# Patient Record
Sex: MALE | Race: WHITE
[De-identification: names, ages, dates, MRNs, and addresses within clinical notes are randomized per-mention and may not be internally consistent; named-entity substitution may affect disease eponyms.]

---

## 2019-10-30 ENCOUNTER — HOSPITAL ENCOUNTER (OUTPATIENT)
Dept: HOSPITAL 92 - BICRAD | Age: 64
Discharge: HOME | End: 2019-10-30
Attending: FAMILY MEDICINE
Payer: COMMERCIAL

## 2019-10-30 DIAGNOSIS — M16.11: ICD-10-CM

## 2019-10-30 DIAGNOSIS — M25.551: Primary | ICD-10-CM

## 2019-10-30 NOTE — RAD
RIGHT HIP TWO VIEWS:

10/30/19

 

HISTORY: 

Right hip pain.

 

FINDINGS/IMPRESSION: 

Degenerative changes are present. No fracture or dislocation or bony destruction identified. 

 

POS: [facility]

## 2021-02-08 ENCOUNTER — HOSPITAL ENCOUNTER (OUTPATIENT)
Dept: HOSPITAL 92 - ERS | Age: 66
Setting detail: OBSERVATION
LOS: 1 days | Discharge: HOME | End: 2021-02-09
Attending: INTERNAL MEDICINE | Admitting: INTERNAL MEDICINE
Payer: COMMERCIAL

## 2021-02-08 DIAGNOSIS — I10: ICD-10-CM

## 2021-02-08 DIAGNOSIS — I34.0: ICD-10-CM

## 2021-02-08 DIAGNOSIS — J32.9: ICD-10-CM

## 2021-02-08 DIAGNOSIS — E78.5: ICD-10-CM

## 2021-02-08 DIAGNOSIS — Z79.899: ICD-10-CM

## 2021-02-08 DIAGNOSIS — G45.9: Primary | ICD-10-CM

## 2021-02-08 DIAGNOSIS — Z20.822: ICD-10-CM

## 2021-02-08 LAB
ALBUMIN SERPL BCG-MCNC: 4.2 G/DL (ref 3.4–4.8)
ALP SERPL-CCNC: 55 U/L (ref 40–110)
ALT SERPL W P-5'-P-CCNC: 16 U/L (ref 8–55)
ANION GAP SERPL CALC-SCNC: 14 MMOL/L (ref 10–20)
AST SERPL-CCNC: 19 U/L (ref 5–34)
BASOPHILS # BLD AUTO: 0.1 THOU/UL (ref 0–0.2)
BASOPHILS NFR BLD AUTO: 0.9 % (ref 0–1)
BILIRUB SERPL-MCNC: 0.9 MG/DL (ref 0.2–1.2)
BUN SERPL-MCNC: 20 MG/DL (ref 8.4–25.7)
CALCIUM SERPL-MCNC: 9 MG/DL (ref 7.8–10.44)
CHLORIDE SERPL-SCNC: 101 MMOL/L (ref 98–107)
CO2 SERPL-SCNC: 27 MMOL/L (ref 23–31)
CREAT CL PREDICTED SERPL C-G-VRATE: 0 ML/MIN (ref 70–130)
EOSINOPHIL # BLD AUTO: 0.2 THOU/UL (ref 0–0.7)
EOSINOPHIL NFR BLD AUTO: 3.4 % (ref 0–10)
GLOBULIN SER CALC-MCNC: 3.1 G/DL (ref 2.4–3.5)
GLUCOSE SERPL-MCNC: 115 MG/DL (ref 80–115)
HGB BLD-MCNC: 17 G/DL (ref 14–18)
LYMPHOCYTES # BLD: 2 THOU/UL (ref 1.2–3.4)
LYMPHOCYTES NFR BLD AUTO: 27.9 % (ref 21–51)
MCH RBC QN AUTO: 32.1 PG (ref 27–31)
MCV RBC AUTO: 94.9 FL (ref 78–98)
MONOCYTES # BLD AUTO: 0.6 THOU/UL (ref 0.11–0.59)
MONOCYTES NFR BLD AUTO: 8.7 % (ref 0–10)
NEUTROPHILS # BLD AUTO: 4.2 THOU/UL (ref 1.4–6.5)
NEUTROPHILS NFR BLD AUTO: 59.2 % (ref 42–75)
PLATELET # BLD AUTO: 279 THOU/UL (ref 130–400)
POTASSIUM SERPL-SCNC: 4.2 MMOL/L (ref 3.5–5.1)
RBC # BLD AUTO: 5.3 MILL/UL (ref 4.7–6.1)
SODIUM SERPL-SCNC: 138 MMOL/L (ref 136–145)
TROPONIN I SERPL DL<=0.01 NG/ML-MCNC: (no result) NG/ML (ref ?–0.03)
TROPONIN I SERPL DL<=0.01 NG/ML-MCNC: (no result) NG/ML (ref ?–0.03)
WBC # BLD AUTO: 7.1 THOU/UL (ref 4.8–10.8)

## 2021-02-08 PROCEDURE — 84484 ASSAY OF TROPONIN QUANT: CPT

## 2021-02-08 PROCEDURE — 80048 BASIC METABOLIC PNL TOTAL CA: CPT

## 2021-02-08 PROCEDURE — 87635 SARS-COV-2 COVID-19 AMP PRB: CPT

## 2021-02-08 PROCEDURE — 93880 EXTRACRANIAL BILAT STUDY: CPT

## 2021-02-08 PROCEDURE — 80053 COMPREHEN METABOLIC PANEL: CPT

## 2021-02-08 PROCEDURE — 70551 MRI BRAIN STEM W/O DYE: CPT

## 2021-02-08 PROCEDURE — 80061 LIPID PANEL: CPT

## 2021-02-08 PROCEDURE — 93306 TTE W/DOPPLER COMPLETE: CPT

## 2021-02-08 PROCEDURE — U0005 INFEC AGEN DETEC AMPLI PROBE: HCPCS

## 2021-02-08 PROCEDURE — 83036 HEMOGLOBIN GLYCOSYLATED A1C: CPT

## 2021-02-08 PROCEDURE — 36415 COLL VENOUS BLD VENIPUNCTURE: CPT

## 2021-02-08 PROCEDURE — G0378 HOSPITAL OBSERVATION PER HR: HCPCS

## 2021-02-08 PROCEDURE — U0003 INFECTIOUS AGENT DETECTION BY NUCLEIC ACID (DNA OR RNA); SEVERE ACUTE RESPIRATORY SYNDROME CORONAVIRUS 2 (SARS-COV-2) (CORONAVIRUS DISEASE [COVID-19]), AMPLIFIED PROBE TECHNIQUE, MAKING USE OF HIGH THROUGHPUT TECHNOLOGIES AS DESCRIBED BY CMS-2020-01-R: HCPCS

## 2021-02-08 PROCEDURE — 70450 CT HEAD/BRAIN W/O DYE: CPT

## 2021-02-08 PROCEDURE — 85025 COMPLETE CBC W/AUTO DIFF WBC: CPT

## 2021-02-08 PROCEDURE — 93005 ELECTROCARDIOGRAM TRACING: CPT

## 2021-02-08 NOTE — CT
CT HEAD WITHOUT IV CONTRAST



COMPARISON:

 None



HISTORY:

 Episode of left arm numbness and tingling as well as left-sided facial weakness. Symptoms have resol
li.



TECHNIQUE: Axial CT imaging at 5 mm intervals from vertex through skull base without contrast



FINDINGS:

There are scattered areas of decreased attenuation in the periventricular white matter which are nons
pecific but likely reflective of chronic small vessel ischemic changes. Low-attenuation focus is

seen in the right globus pallidus suggesting a tiny lacunar infarction of indeterminate age.



There is mild cerebral volume loss. The ventricular system is normal in size, shape, and position for
 the degree of sulcal atrophy.



There is no evidence of an acute cortical infarction, hemorrhage, mass effect, or midline shift.



Skull base has a normal CT appearance.

Gross thickening is seen in several ethmoidal air cells. Visualized mastoid air cells are clear.



Osseous structures appear intact.



IMPRESSION:

1. No acute intracranial abnormality demonstrated.

2. Chronic small vessel ischemic changes and cerebral volume loss. 

3. Sinus disease. 



Reported By: Kolton Khan 

Electronically Signed:  2/8/2021 2:41 PM

## 2021-02-08 NOTE — PDOC.HHP
Hospitalist HPI


Left face and arm numbness 


History of Present Illness: 


Patient is 65 year-old with PMH of HTN and HLD who presents to the ED with left 

face and left arm/hand numbness that happened around noon today and it lasted 

for about 5 minutes before it resolved. He is not sure about facial droop but no

focal weakness. 


ED Course: 





CT head showed no acute intracranial abnormalities. 


Allergies/Adverse Reactions: 


                                        











Allergy/AdvReac Type Severity Reaction Status Date / Time


 


No Known Allergies Allergy   Unverified 02/08/21 18:36











Past History: 


PMHx: HTN, HLD 





PSHx: TURP, Tosillectomy    





FHx:Mother: CHF    





Social:drinks 2 beer per day, never smoker, denies drug 








Hospitalist HPI ROS


Constitutional: denies: fever, chills


Eyes: denies: vision change


ENT: denies: throat pain


Respiratory: denies: shortness of breath


Cardiovascular: denies: chest pain


Gastrointestinal: denies: nausea, vomiting


Genitourinary: denies: dysuria


Skin: denies: rash


Other: 





positive for facial and arm numbness 





Hospitalist Exam


General Appearance: NAD


Eye: PERRL


ENT: moist mucosa


Neck: supple


Heart: RRR, no murmur


Respiratory: CTAB, no wheezes


Gastrointestinal: soft, non-tender, non-distended


Extremities: no edema


Skin: normal turgor


Neurological: normal sensation to touch, no weakness, no focal deficits


Musculoskeletal: normal strength


Psychiatric: normal affect, A&O x 3





Hospitalist Results


Result Diagrams: 


                                 02/08/21 12:52





                                 02/08/21 12:52


Lab results: 


                             Laboratory Last Values











WBC  7.1 thou/uL (4.8-10.8)   02/08/21  12:52    


 


RBC  5.30 mill/uL (4.70-6.10)   02/08/21  12:52    


 


Hgb  17.0 g/dL (14.0-18.0)   02/08/21  12:52    


 


Hct  50.3 % (42.0-52.0)   02/08/21  12:52    


 


MCV  94.9 fL (78.0-98.0)   02/08/21  12:52    


 


MCH  32.1 pg (27.0-31.0)  H  02/08/21  12:52    


 


MCHC  33.9 g/dL (32.0-36.0)   02/08/21  12:52    


 


RDW  11.5 % (11.5-14.5)   02/08/21  12:52    


 


Plt Count  279 thou/uL (130-400)   02/08/21  12:52    


 


MPV  7.7 fL (7.4-10.4)   02/08/21  12:52    


 


Neutrophils %  59.2 % (42.0-75.0)   02/08/21  12:52    


 


Lymphocytes %  27.9 % (21.0-51.0)   02/08/21  12:52    


 


Monocytes %  8.7 % (0.0-10.0)   02/08/21  12:52    


 


Eosinophils %  3.4 % (0.0-10.0)   02/08/21  12:52    


 


Basophils %  0.9 % (0.0-1.0)   02/08/21  12:52    


 


Neutrophils #  4.2 thou/uL (1.40-6.50)   02/08/21  12:52    


 


Lymphocytes #  2.0 thou/uL (1.20-3.40)   02/08/21  12:52    


 


Monocytes #  0.6 thou/uL (0.11-0.59)  H  02/08/21  12:52    


 


Eosinophils #  0.2 thou/uL (0.0-0.7)   02/08/21  12:52    


 


Basophils #  0.1 thou/uL (0.0-0.2)   02/08/21  12:52    


 


Sodium  138 mmol/L (136-145)   02/08/21  12:52    


 


Potassium  4.2 mmol/L (3.5-5.1)   02/08/21  12:52    


 


Chloride  101 mmol/L ()   02/08/21  12:52    


 


Carbon Dioxide  27 mmol/L (23-31)   02/08/21  12:52    


 


Anion Gap  14 mmol/L (10-20)   02/08/21  12:52    


 


BUN  20 mg/dL (8.4-25.7)   02/08/21  12:52    


 


Creatinine  1.05 mg/dL (0.7-1.3)   02/08/21  12:52    


 


Estimated GFR (MDRD)  71   02/08/21  12:52    


 


Glucose  115 mg/dL ()   02/08/21  12:52    


 


Calcium  9.0 mg/dL (7.8-10.44)   02/08/21  12:52    


 


Total Bilirubin  0.9 mg/dL (0.2-1.2)   02/08/21  12:52    


 


AST  19 U/L (5-34)   02/08/21  12:52    


 


ALT  16 U/L (8-55)   02/08/21  12:52    


 


Alkaline Phosphatase  55 U/L ()   02/08/21  12:52    


 


Troponin I  Less than  0.010 ng/mL (< 0.028)   02/08/21  19:09    


 


Serum Total Protein  7.3 g/dL (5.8-8.1)   02/08/21  12:52    


 


Albumin  4.2 g/dL (3.4-4.8)   02/08/21  12:52    


 


Globulin  3.1 g/dL (2.4-3.5)   02/08/21  12:52    


 


Albumin/Globulin Ratio  1.4 g/dL (1.2-2.2)   02/08/21  12:52    











  ** CT scan - head


Status: image reviewed by me





Hospitalist H&P A/P


(1) TIA (transient ischemic attack)


Code(s): G45.9 - TRANSIENT CEREBRAL ISCHEMIC ATTACK, UNSPECIFIED   Status: Acute

  


Assessment and Plan: 


Patient's symptoms now resolved. CT head showed no acute intracranial 

abnormalities. His symptoms are likely from TIA.





Plan:


-frequent neuro checks


-MRI brain, Echo, carotid doppler 


-Neurology consulted  








(2) HTN (hypertension)


Code(s): I10 - ESSENTIAL (PRIMARY) HYPERTENSION   Status: Chronic   


Assessment and Plan: 


-cont home meds 








(3) HLD (hyperlipidemia)


Code(s): E78.5 - HYPERLIPIDEMIA, UNSPECIFIED   Status: Chronic   


Assessment and Plan: 


-cont home meds

## 2021-02-09 VITALS — DIASTOLIC BLOOD PRESSURE: 99 MMHG | SYSTOLIC BLOOD PRESSURE: 145 MMHG

## 2021-02-09 VITALS — TEMPERATURE: 98.7 F

## 2021-02-09 LAB
ANION GAP SERPL CALC-SCNC: 15 MMOL/L (ref 10–20)
BASOPHILS # BLD AUTO: 0 THOU/UL (ref 0–0.2)
BASOPHILS NFR BLD AUTO: 0.5 % (ref 0–1)
BUN SERPL-MCNC: 16 MG/DL (ref 8.4–25.7)
CALCIUM SERPL-MCNC: 8.6 MG/DL (ref 7.8–10.44)
CHD RISK SERPL-RTO: 6.4 (ref ?–4.5)
CHLORIDE SERPL-SCNC: 104 MMOL/L (ref 98–107)
CHOLEST SERPL-MCNC: 238 MG/DL
CO2 SERPL-SCNC: 24 MMOL/L (ref 23–31)
CREAT CL PREDICTED SERPL C-G-VRATE: 0 ML/MIN (ref 70–130)
EOSINOPHIL # BLD AUTO: 0.3 THOU/UL (ref 0–0.7)
EOSINOPHIL NFR BLD AUTO: 4.3 % (ref 0–10)
GLUCOSE SERPL-MCNC: 103 MG/DL (ref 80–115)
HDLC SERPL-MCNC: 37 MG/DL
HGB BLD-MCNC: 17 G/DL (ref 14–18)
LDLC SERPL CALC-MCNC: 171 MG/DL
LYMPHOCYTES # BLD: 1.5 THOU/UL (ref 1.2–3.4)
LYMPHOCYTES NFR BLD AUTO: 20.1 % (ref 21–51)
MCH RBC QN AUTO: 32.8 PG (ref 27–31)
MCV RBC AUTO: 94.7 FL (ref 78–98)
MONOCYTES # BLD AUTO: 0.6 THOU/UL (ref 0.11–0.59)
MONOCYTES NFR BLD AUTO: 7.8 % (ref 0–10)
NEUTROPHILS # BLD AUTO: 5 THOU/UL (ref 1.4–6.5)
NEUTROPHILS NFR BLD AUTO: 67.3 % (ref 42–75)
PLATELET # BLD AUTO: 284 THOU/UL (ref 130–400)
POTASSIUM SERPL-SCNC: 4.1 MMOL/L (ref 3.5–5.1)
RBC # BLD AUTO: 5.19 MILL/UL (ref 4.7–6.1)
SODIUM SERPL-SCNC: 139 MMOL/L (ref 136–145)
TRIGL SERPL-MCNC: 148 MG/DL (ref ?–150)
WBC # BLD AUTO: 7.4 THOU/UL (ref 4.8–10.8)

## 2021-02-09 NOTE — PDOC.DS.DS
Provider


Date of Admission: 


02/08/21 16:11





Date of Discharge: 02/09/21


Admitting Provider: 


Yvette Knowles MD





Consultations: Neurology


Primary Care Physician: 


Dorain Kolb, DO








Course


Hospital Course: 


Diagnosis


TIA


HTN


HLD 





Hospital Course: Patient is a 65-year-old male with PMH of HTN and HLD who p

resents to the ED with left face/left arm paresthesia that lasted for 5 minutes.

 He remained asymptomatic during his hospitalization.  MRI brain showed no acute

intracranial abnormalities. EKG showed NSR. Carotid Doppler showed no 

hemodynamically significant stenosis. Hgb A1c is normal. LDL is elevated.  Echo 

was done, result pending.  Patient would like to be discharged now, so he will 

follow-up his Echo results with his PCP. 





Resuscitation Status: 








02/08/21 18:37


Resuscitation Status Routine 


   Resuscitation Status: FULL: Full Resuscitation








Lab Results: 


                                        





                                 02/09/21 04:28 





                                 02/09/21 04:27 





Abnormal Lab Results - Last 48 hrs





02/08/21 12:52: MCH 32.1 H, Monocytes # 0.6 H


02/09/21 04:27: Cholesterol 238 H


02/09/21 04:28: MCH 32.8 H, Lymphocytes % 20.1 L, Monocytes # 0.6 H








Vitals: 


                             Vital Signs (12 hours)











  Pulse Resp BP BP BP Pulse Ox


 


 02/09/21 13:50    145/99 H  152/92 H  


 


 02/09/21 11:49    160/88 H  152/94 H  


 


 02/09/21 04:00  68  16    148/78 H  98











Physical Exam: 


The patient was seen and examined on the day of discharge.





General Appearance: NAD


Eye: PERRL


ENT: moist mucosa


Neck: supple


Respiratory: CTAB


Cardiovascular: RRR, no murmur


Gastrointestinal: soft, non-tender, non-distended


Extremities: no edema


Neurological: normal sensation to touch, no weakness, no new deficit


Musculoskeletal: normal strength


PSYCH: normal affect





Problem


(1) TIA (transient ischemic attack)


Code(s): G45.9 - TRANSIENT CEREBRAL ISCHEMIC ATTACK, UNSPECIFIED   Status: Acute

  





(2) HTN (hypertension)


Code(s): I10 - ESSENTIAL (PRIMARY) HYPERTENSION   Status: Chronic   





(3) HLD (hyperlipidemia)


Code(s): E78.5 - HYPERLIPIDEMIA, UNSPECIFIED   Status: Chronic   





Plan


Prescriptions: 


Aspirin 81 mg PO DAILY #30 tab.chew


Atorvastatin Calcium [Lipitor] 40 mg PO DAILY #30 tab


Home Medications: 


                                        











 Medication  Instructions  Recorded  Confirmed  Type


 


Aspirin 81 mg PO DAILY #30 tab.chew 02/09/21  Rx


 


Atorvastatin Calcium [Lipitor] 40 mg PO DAILY #30 tab 02/09/21  Rx











Allergies: 








No Known Allergies Allergy (Unverified 02/08/21 18:36)


   





Discharge Instructions:: 


F/u with your PCP for Echo result


Activity:: Activity as Tolerated


Nourishment:: Heart Healthy Diet


Referrals: 


Dorian Kolb DO [Primary Care Provider] - 3 Days


Disposition: HOME





Quality


CORE MEASURES:: Stroke/TIA


Did you prescribe antithrombotic therapy?: Yes


Did you prescribe anticoagulant for A Fib/Flutter?: No


Specify reason for no DC anticoagulant: Treatment not indicated


Did you prescribe a statin medication?: Yes

## 2021-02-09 NOTE — ULT
EXAM: Carotid ultrasound



HISTORY: TIA with left-sided weakness



COMPARISON: None



TECHNIQUE: Multiplanar grayscale and color Doppler images were obtained in a carotid ultrasound. Spec
tral analysis of the Doppler waveforms were performed.



FINDINGS:

No significant plaque is visualized in either internal carotid artery. No significant plaque is seen 
in either common carotid artery.



The Doppler waveforms are normal in the visualized vessels.



Peak systolic velocity in the right internal carotid artery 59 cm/s.

Peak systolic velocity in the right common carotid artery 62 cm/s.

The right ICA/CCA ratio is 1.0.



Peak systolic velocity in the left internal carotid artery 77 cm/s.

Peak systolic velocity in the left common carotid artery 73 cm/s.

The left ICA/CCA ratio is 1.1.



Both vertebral arteries demonstrate antegrade flow without focal stenosis



IMPRESSION:

No evidence of hemodynamically significant stenosis.



Reported By: Dorian Alfaro 

Electronically Signed:  2/9/2021 7:39 AM

## 2021-02-09 NOTE — CON
NEUROLOGY CONSULTATION

DATE OF CONSULTATION:  02/08/2021



REASON FOR CONSULTATION:  Transient ischemic attack.



HISTORY OF PRESENT ILLNESS:  Mr. Whitlock is a 65-year-old male with medical 
history

significant for hypertension and hyperlipidemia, presented to the emergency room

on 2/8/2021with left facial and left arm paresthesias, which happened

around 12 p.m. on 02/07/2021 and lasted for about 5 minutes.  The patient denies
any

focal weakness, nausea, vomiting, headache, chest pain, double vision, blurred

vision, involuntary movements, altered mental status, confusion associated with

episode.  The wife is at bedside, but she did not witness the event.  In the

emergency room, head CT was done, which was negative for acute intracranial

pathology. 



ALLERGIES:  NO KNOWN DRUG ALLERGIES.



PAST MEDICAL HISTORY:  Hypertension, hyperlipidemia.



PAST SURGICAL HISTORY:  TURP, tonsillectomy.



FAMILY HISTORY:  Significant for congestive heart failure in the mother.



SOCIAL HISTORY:  The patient drinks 2 beers per day.  Denies smoking or illegal

drug use.  .  Lives with his wife. 



REVIEW OF SYSTEMS:  All systems reviewed and were negative except the pertinent

positives and negatives mentioned in the HPI. 



PHYSICAL EXAMINATION:



148/78 98 16

GENERAL APPEARANCE:  NAD. 

CVS:  Regular rate and rhythm. 

CHEST:  Clear. 

ABDOMEN:  Soft. 

NECK:  Supple. 

NEUROLOGICAL:  Mental status, the patient is alert and oriented to person, 
place,

and time.  Recent and remote memory intact.  Fund of knowledge is appropriate.

Speech is clear.  Cranial nerves 2 through 12 intact.  Muscle tone and bulk are

normal.  Strength 5/5 bilaterally.  Sensory intact.  Cerebellar, finger-nose 
testing

intact.  Gait deferred due to the patient's safety reason. 



DATA REVIEWED:  I reviewed the labs, which were essentially unremarkable.  Head 
CT

did not reveal any acute intracranial pathology. 



Lab results: 

                             Laboratory Last Values







WBC  7.1 thou/uL (4.8-10.8)   02/08/21  12:52    

 

RBC  5.30 mill/uL (4.70-6.10)   02/08/21  12:52    

 

Hgb  17.0 g/dL (14.0-18.0)   02/08/21  12:52    

 

Hct  50.3 % (42.0-52.0)   02/08/21  12:52    

 

MCV  94.9 fL (78.0-98.0)   02/08/21  12:52    

 

MCH  32.1 pg (27.0-31.0)  H  02/08/21  12:52    

 

MCHC  33.9 g/dL (32.0-36.0)   02/08/21  12:52    

 

RDW  11.5 % (11.5-14.5)   02/08/21  12:52    

 

Plt Count  279 thou/uL (130-400)   02/08/21  12:52    

 

MPV  7.7 fL (7.4-10.4)   02/08/21  12:52    

 

Neutrophils %  59.2 % (42.0-75.0)   02/08/21  12:52    

 

Lymphocytes %  27.9 % (21.0-51.0)   02/08/21  12:52    

 

Monocytes %  8.7 % (0.0-10.0)   02/08/21  12:52    

 

Eosinophils %  3.4 % (0.0-10.0)   02/08/21  12:52    

 

Basophils %  0.9 % (0.0-1.0)   02/08/21  12:52    

 

Neutrophils #  4.2 thou/uL (1.40-6.50)   02/08/21  12:52    

 

Lymphocytes #  2.0 thou/uL (1.20-3.40)   02/08/21  12:52    

 

Monocytes #  0.6 thou/uL (0.11-0.59)  H  02/08/21  12:52    

 

Eosinophils #  0.2 thou/uL (0.0-0.7)   02/08/21  12:52    

 

Basophils #  0.1 thou/uL (0.0-0.2)   02/08/21  12:52    

 

Sodium  138 mmol/L (136-145)   02/08/21  12:52    

 

Potassium  4.2 mmol/L (3.5-5.1)   02/08/21  12:52    

 

Chloride  101 mmol/L ()   02/08/21  12:52    

 

Carbon Dioxide  27 mmol/L (23-31)   02/08/21  12:52    

 

Anion Gap  14 mmol/L (10-20)   02/08/21  12:52    

 

BUN  20 mg/dL (8.4-25.7)   02/08/21  12:52    

 

Creatinine  1.05 mg/dL (0.7-1.3)   02/08/21  12:52    

 

Estimated GFR (MDRD)  71   02/08/21  12:52    

 

Glucose  115 mg/dL ()   02/08/21  12:52    

 

Calcium  9.0 mg/dL (7.8-10.44)   02/08/21  12:52    

 

Total Bilirubin  0.9 mg/dL (0.2-1.2)   02/08/21  12:52    

 

AST  19 U/L (5-34)   02/08/21  12:52    

 

ALT  16 U/L (8-55)   02/08/21  12:52    

 

Alkaline Phosphatase  55 U/L ()   02/08/21  12:52    

 

Troponin I  Less than  0.010 ng/mL (< 0.028)   02/08/21  19:09    

 

Serum Total Protein  7.3 g/dL (5.8-8.1)   02/08/21  12:52    

 

Albumin  4.2 g/dL (3.4-4.8)   02/08/21  12:52    

 

Globulin  3.1 g/dL (2.4-3.5)   02/08/21  12:52    

 

Albumin/Globulin Ratio  1.4 g/dL (1.2-2.2)   02/08/21  12:52    







  ** CT scan - head

Status: image reviewed by me



 



 







ASSESSMENT AND PLAN:  



(1) TIA (transient ischemic attack)

Code(s): G45.9 - TRANSIENT CEREBRAL ISCHEMIC ATTACK, UNSPECIFIED   Status: Acute
  

 

(2) HTN (hypertension)

Code(s): I10 - ESSENTIAL (PRIMARY) HYPERTENSION   Status: Chronic   

 

(3) HLD (hyperlipidemia)

Code(s): E78.5 - HYPERLIPIDEMIA, UNSPECIFIED   Status: Chronic   





Mr. Whitlock is a 65-year-old male with history significant for

hypertension and hyperlipidemia, presented with 5-minute episode of facial and 
right

arm paresthesias, which resolved and the patient is back to his baseline, most

likely transient ischemic attack.  I have considered MRI of the brain to rule 
out

acute intracranial process, 2D echo to evaluate for left ventricular ejection

fraction, and carotid Dopplers to rule out hemodynamically significant stenosis.

Telemetry to rule out arrhythmias.  Neuro checks every 4 hours.  Start aspirin 
and

statin for secondary stroke prevention.  Check hemoglobin A1c, fasting lipid 
panel,

and TSH.  Permissive control of blood pressure at this time.  Strict control of

blood glucose.  PT/OT/Speech.  Continue medical management per primary team.  We

will continue to follow. 



Thank you for the consult.  Case discussed in detail with the patient, wife at

bedside, and also with the ED physician, Dr. Richmond. 







Job ID:  [id number]



ALEXY

## 2021-02-09 NOTE — MRI
MRI BRAIN WITHOUT CONTRAST:



HISTORY: Left-sided arm numbness and tingling. Left-sided facial weakness



CORRELATION: CT scan from 2/8/2021.



FINDINGS:



No restricted diffusion is seen. There are foci of T2 prolongation in the periventricular white matte
r, consistent with mild chronic small vessel ischemic disease. The ventricular size is appropriate

and the basilar cisterns are patent.



No evidence of acute infarct, hemorrhage, midline shift or abnormal extra-axial fluid collections is 
seen.



There is mucosal disease in the paranasal sinuses.



IMPRESSION: No evidence of acute intracranial process.



Reported By: Damien Vargas 

Electronically Signed:  2/9/2021 8:55 AM